# Patient Record
Sex: MALE
[De-identification: names, ages, dates, MRNs, and addresses within clinical notes are randomized per-mention and may not be internally consistent; named-entity substitution may affect disease eponyms.]

---

## 2022-08-24 PROBLEM — Z00.00 ENCOUNTER FOR PREVENTIVE HEALTH EXAMINATION: Status: ACTIVE | Noted: 2022-08-24

## 2022-08-25 ENCOUNTER — APPOINTMENT (OUTPATIENT)
Dept: UROLOGY | Facility: CLINIC | Age: 37
End: 2022-08-25

## 2022-08-25 VITALS
BODY MASS INDEX: 26.21 KG/M2 | SYSTOLIC BLOOD PRESSURE: 147 MMHG | DIASTOLIC BLOOD PRESSURE: 93 MMHG | TEMPERATURE: 98 F | HEART RATE: 91 BPM | WEIGHT: 167 LBS | HEIGHT: 67 IN

## 2022-08-25 DIAGNOSIS — E29.1 TESTICULAR HYPOFUNCTION: ICD-10-CM

## 2022-08-25 PROCEDURE — 99072 ADDL SUPL MATRL&STAF TM PHE: CPT

## 2022-08-25 PROCEDURE — 99204 OFFICE O/P NEW MOD 45 MIN: CPT | Mod: 25

## 2022-08-25 PROCEDURE — 93975 VASCULAR STUDY: CPT

## 2022-08-25 NOTE — PHYSICAL EXAM
[General Appearance - Well Developed] : well developed [General Appearance - Well Nourished] : well nourished [Normal Appearance] : normal appearance [Well Groomed] : well groomed [General Appearance - In No Acute Distress] : no acute distress [Edema] : no peripheral edema [Respiration, Rhythm And Depth] : normal respiratory rhythm and effort [Exaggerated Use Of Accessory Muscles For Inspiration] : no accessory muscle use [Abdomen Soft] : soft [Abdomen Tenderness] : non-tender [Costovertebral Angle Tenderness] : no ~M costovertebral angle tenderness [Urethral Meatus] : meatus normal [Penis Abnormality] : normal circumcised penis [Urinary Bladder Findings] : the bladder was normal on palpation [Scrotum] : the scrotum was normal [Testes Mass (___cm)] : there were no testicular masses [Normal Station and Gait] : the gait and station were normal for the patient's age [] : no rash [No Focal Deficits] : no focal deficits [Oriented To Time, Place, And Person] : oriented to person, place, and time [Affect] : the affect was normal [Mood] : the mood was normal [Not Anxious] : not anxious [No Palpable Adenopathy] : no palpable adenopathy [FreeTextEntry1] : Left GII varicocele, left 12 cc testis, Right 18 cc testis

## 2022-08-25 NOTE — HISTORY OF PRESENT ILLNESS
[Currently Experiencing ___] :  [unfilled] [None] : None [FreeTextEntry1] : 36 yr old male with no significant medical history, lost 100 lbs for 11 month. \par Mrs Uribe wife is 33 yr old\par female infertility  - on fertility pills \par trying to conceive for 8-9 month\par they have 5 chidden, all conceived naturally \par Semen analysis  Aug 5, 2022 - [facility] fertility \par 3.10 / 79 mil/ml / 85 % mot / 1% morp \par Denies urinary problems \par low progressive motiliity noted\par

## 2022-08-25 NOTE — ASSESSMENT
[FreeTextEntry1] : Hypogonadism \par Had 5 children - conceived naturally \par SA - teratospermia - Aug 2022\par repeat SA. \par baseline labs: TT, FSH, LH, E2, CBC, CMP \par \par Varicocele \par Discussed role in fertility\par Confirmed on scrotal ultrasound today \par \par given isolated teratospermia I am hesitant to proceed with varicocele repair\par if continued failure for IUI